# Patient Record
Sex: MALE | Race: WHITE
[De-identification: names, ages, dates, MRNs, and addresses within clinical notes are randomized per-mention and may not be internally consistent; named-entity substitution may affect disease eponyms.]

---

## 2019-05-01 ENCOUNTER — HOSPITAL ENCOUNTER (EMERGENCY)
Dept: HOSPITAL 91 - E/R | Age: 59
Discharge: LEFT BEFORE DISCHARGE | End: 2019-05-01
Payer: SELF-PAY

## 2019-05-01 ENCOUNTER — HOSPITAL ENCOUNTER (EMERGENCY)
Dept: HOSPITAL 10 - E/R | Age: 59
Discharge: LEFT BEFORE DISCHARGE | End: 2019-05-01
Payer: SELF-PAY

## 2019-05-01 VITALS
WEIGHT: 181 LBS | BODY MASS INDEX: 26.81 KG/M2 | HEIGHT: 69 IN | HEIGHT: 69 IN | WEIGHT: 181 LBS | BODY MASS INDEX: 26.81 KG/M2

## 2019-05-01 VITALS — HEART RATE: 98 BPM | RESPIRATION RATE: 24 BRPM | SYSTOLIC BLOOD PRESSURE: 161 MMHG | DIASTOLIC BLOOD PRESSURE: 95 MMHG

## 2019-05-01 DIAGNOSIS — Z53.21: Primary | ICD-10-CM

## 2019-05-01 PROCEDURE — 93005 ELECTROCARDIOGRAM TRACING: CPT

## 2020-09-18 ENCOUNTER — HOSPITAL ENCOUNTER (EMERGENCY)
Dept: HOSPITAL 54 - ER | Age: 60
Discharge: HOME | End: 2020-09-18
Payer: COMMERCIAL

## 2020-09-18 VITALS — SYSTOLIC BLOOD PRESSURE: 145 MMHG | DIASTOLIC BLOOD PRESSURE: 77 MMHG

## 2020-09-18 VITALS — HEIGHT: 69 IN | WEIGHT: 175 LBS | BODY MASS INDEX: 25.92 KG/M2

## 2020-09-18 DIAGNOSIS — Z86.73: ICD-10-CM

## 2020-09-18 DIAGNOSIS — E78.5: ICD-10-CM

## 2020-09-18 DIAGNOSIS — G51.0: Primary | ICD-10-CM

## 2020-09-18 NOTE — NUR
BIBS FROM HOME, DROVE HIMSELF TO ER. AAOX4. NOT IN RESP DISTRESS, BREATHING 
EVEN AND UNLABORED. AMBULATORY WITH OUT ANY ASSIST ON STEADY GAIT. CAME IN FOR 
R SIDE FACIAL DROOP AND NUMBENESS ON THE R ARM. PER PT, HE STARTED TO HAVE THE 
SYMPTOMS BACK ON TUESDAY. PT THEN WENT TO [facility], GOT ADMITTED AND 
LEFT AMA FOR PERSONAL REASON DESPITE STILL HAVING SYMPTOMS. PT IS NOTED WITH R 
SIDED FACIAL DROOP AND R ARM WEAKNESS. MD AT BEDSIDE, PT PRESENTED HIS MEDICAL 
RECORDS FROM [facility]. UPON MD REVIEWING RECORDS, PT WAS DIAGNOSED 
WITH BELLS PALSY. RECORDS DID NOT INDICATE ANY STROKE THAT OCCURED. PT 
PRESENTED MEDICATIONS THAT WERE PRESCRIBE WHICH ARE PREDNISONE AND VALCYCLOVIR. 
MD EDUCATED PT REGARDING HIS CONDITION AND REINFORCED BY THE RN.